# Patient Record
Sex: FEMALE | Race: BLACK OR AFRICAN AMERICAN | NOT HISPANIC OR LATINO | Employment: FULL TIME | URBAN - METROPOLITAN AREA
[De-identification: names, ages, dates, MRNs, and addresses within clinical notes are randomized per-mention and may not be internally consistent; named-entity substitution may affect disease eponyms.]

---

## 2024-05-13 ENCOUNTER — LAB VISIT (OUTPATIENT)
Dept: LAB | Facility: HOSPITAL | Age: 39
End: 2024-05-13
Attending: STUDENT IN AN ORGANIZED HEALTH CARE EDUCATION/TRAINING PROGRAM
Payer: MEDICAID

## 2024-05-13 ENCOUNTER — OFFICE VISIT (OUTPATIENT)
Facility: CLINIC | Age: 39
End: 2024-05-13
Payer: MEDICAID

## 2024-05-13 VITALS
RESPIRATION RATE: 18 BRPM | TEMPERATURE: 98 F | OXYGEN SATURATION: 98 % | WEIGHT: 252.19 LBS | BODY MASS INDEX: 39.58 KG/M2 | HEART RATE: 84 BPM | HEIGHT: 67 IN | SYSTOLIC BLOOD PRESSURE: 115 MMHG | DIASTOLIC BLOOD PRESSURE: 80 MMHG

## 2024-05-13 DIAGNOSIS — Z00.00 ANNUAL PHYSICAL EXAM: ICD-10-CM

## 2024-05-13 DIAGNOSIS — I10 BENIGN ESSENTIAL HTN: ICD-10-CM

## 2024-05-13 DIAGNOSIS — H90.3 SNHL (SENSORY-NEURAL HEARING LOSS), ASYMMETRICAL: ICD-10-CM

## 2024-05-13 DIAGNOSIS — R73.03 PREDIABETES: ICD-10-CM

## 2024-05-13 DIAGNOSIS — Z11.3 ROUTINE SCREENING FOR STI (SEXUALLY TRANSMITTED INFECTION): ICD-10-CM

## 2024-05-13 DIAGNOSIS — F33.1 MODERATE EPISODE OF RECURRENT MAJOR DEPRESSIVE DISORDER: ICD-10-CM

## 2024-05-13 DIAGNOSIS — Z00.00 ANNUAL PHYSICAL EXAM: Primary | ICD-10-CM

## 2024-05-13 DIAGNOSIS — N92.6 IRREGULAR MENSTRUATION: ICD-10-CM

## 2024-05-13 LAB
ALBUMIN SERPL BCP-MCNC: 3.9 G/DL (ref 3.5–5.2)
ALP SERPL-CCNC: 98 U/L (ref 55–135)
ALT SERPL W/O P-5'-P-CCNC: 21 U/L (ref 10–44)
ANION GAP SERPL CALC-SCNC: 9 MMOL/L (ref 8–16)
AST SERPL-CCNC: 17 U/L (ref 10–40)
BASOPHILS # BLD AUTO: 0.02 K/UL (ref 0–0.2)
BASOPHILS NFR BLD: 0.4 % (ref 0–1.9)
BILIRUB SERPL-MCNC: 0.3 MG/DL (ref 0.1–1)
BUN SERPL-MCNC: 9 MG/DL (ref 6–20)
CALCIUM SERPL-MCNC: 9.4 MG/DL (ref 8.7–10.5)
CHLORIDE SERPL-SCNC: 103 MMOL/L (ref 95–110)
CHOLEST SERPL-MCNC: 151 MG/DL (ref 120–199)
CHOLEST/HDLC SERPL: 3.7 {RATIO} (ref 2–5)
CO2 SERPL-SCNC: 28 MMOL/L (ref 23–29)
CREAT SERPL-MCNC: 0.7 MG/DL (ref 0.5–1.4)
DIFFERENTIAL METHOD BLD: ABNORMAL
EOSINOPHIL # BLD AUTO: 0 K/UL (ref 0–0.5)
EOSINOPHIL NFR BLD: 0.6 % (ref 0–8)
ERYTHROCYTE [DISTWIDTH] IN BLOOD BY AUTOMATED COUNT: 13.6 % (ref 11.5–14.5)
EST. GFR  (NO RACE VARIABLE): >60 ML/MIN/1.73 M^2
GLUCOSE SERPL-MCNC: 97 MG/DL (ref 70–110)
HCT VFR BLD AUTO: 41.8 % (ref 37–48.5)
HDLC SERPL-MCNC: 41 MG/DL (ref 40–75)
HDLC SERPL: 27.2 % (ref 20–50)
HGB BLD-MCNC: 12.6 G/DL (ref 12–16)
IMM GRANULOCYTES # BLD AUTO: 0.02 K/UL (ref 0–0.04)
IMM GRANULOCYTES NFR BLD AUTO: 0.4 % (ref 0–0.5)
LDLC SERPL CALC-MCNC: 81.6 MG/DL (ref 63–159)
LYMPHOCYTES # BLD AUTO: 1.3 K/UL (ref 1–4.8)
LYMPHOCYTES NFR BLD: 27.8 % (ref 18–48)
MCH RBC QN AUTO: 27.3 PG (ref 27–31)
MCHC RBC AUTO-ENTMCNC: 30.1 G/DL (ref 32–36)
MCV RBC AUTO: 91 FL (ref 82–98)
MONOCYTES # BLD AUTO: 0.4 K/UL (ref 0.3–1)
MONOCYTES NFR BLD: 9.2 % (ref 4–15)
NEUTROPHILS # BLD AUTO: 2.9 K/UL (ref 1.8–7.7)
NEUTROPHILS NFR BLD: 61.6 % (ref 38–73)
NONHDLC SERPL-MCNC: 110 MG/DL
NRBC BLD-RTO: 0 /100 WBC
PLATELET # BLD AUTO: 234 K/UL (ref 150–450)
PMV BLD AUTO: 11.1 FL (ref 9.2–12.9)
POTASSIUM SERPL-SCNC: 4.1 MMOL/L (ref 3.5–5.1)
PROT SERPL-MCNC: 7.4 G/DL (ref 6–8.4)
RBC # BLD AUTO: 4.61 M/UL (ref 4–5.4)
SODIUM SERPL-SCNC: 140 MMOL/L (ref 136–145)
TRIGL SERPL-MCNC: 142 MG/DL (ref 30–150)
TSH SERPL DL<=0.005 MIU/L-ACNC: 0.97 UIU/ML (ref 0.4–4)
WBC # BLD AUTO: 4.78 K/UL (ref 3.9–12.7)

## 2024-05-13 PROCEDURE — 36415 COLL VENOUS BLD VENIPUNCTURE: CPT | Performed by: STUDENT IN AN ORGANIZED HEALTH CARE EDUCATION/TRAINING PROGRAM

## 2024-05-13 PROCEDURE — 99204 OFFICE O/P NEW MOD 45 MIN: CPT | Mod: PBBFAC,PN | Performed by: STUDENT IN AN ORGANIZED HEALTH CARE EDUCATION/TRAINING PROGRAM

## 2024-05-13 PROCEDURE — 83001 ASSAY OF GONADOTROPIN (FSH): CPT | Performed by: STUDENT IN AN ORGANIZED HEALTH CARE EDUCATION/TRAINING PROGRAM

## 2024-05-13 PROCEDURE — 1159F MED LIST DOCD IN RCRD: CPT | Mod: CPTII,,, | Performed by: STUDENT IN AN ORGANIZED HEALTH CARE EDUCATION/TRAINING PROGRAM

## 2024-05-13 PROCEDURE — 80061 LIPID PANEL: CPT | Performed by: STUDENT IN AN ORGANIZED HEALTH CARE EDUCATION/TRAINING PROGRAM

## 2024-05-13 PROCEDURE — 86803 HEPATITIS C AB TEST: CPT | Performed by: STUDENT IN AN ORGANIZED HEALTH CARE EDUCATION/TRAINING PROGRAM

## 2024-05-13 PROCEDURE — 1160F RVW MEDS BY RX/DR IN RCRD: CPT | Mod: CPTII,,, | Performed by: STUDENT IN AN ORGANIZED HEALTH CARE EDUCATION/TRAINING PROGRAM

## 2024-05-13 PROCEDURE — 86593 SYPHILIS TEST NON-TREP QUANT: CPT | Performed by: STUDENT IN AN ORGANIZED HEALTH CARE EDUCATION/TRAINING PROGRAM

## 2024-05-13 PROCEDURE — 99386 PREV VISIT NEW AGE 40-64: CPT | Mod: S$PBB,,, | Performed by: STUDENT IN AN ORGANIZED HEALTH CARE EDUCATION/TRAINING PROGRAM

## 2024-05-13 PROCEDURE — 83036 HEMOGLOBIN GLYCOSYLATED A1C: CPT | Performed by: STUDENT IN AN ORGANIZED HEALTH CARE EDUCATION/TRAINING PROGRAM

## 2024-05-13 PROCEDURE — 84443 ASSAY THYROID STIM HORMONE: CPT | Performed by: STUDENT IN AN ORGANIZED HEALTH CARE EDUCATION/TRAINING PROGRAM

## 2024-05-13 PROCEDURE — 87340 HEPATITIS B SURFACE AG IA: CPT | Performed by: STUDENT IN AN ORGANIZED HEALTH CARE EDUCATION/TRAINING PROGRAM

## 2024-05-13 PROCEDURE — 4010F ACE/ARB THERAPY RXD/TAKEN: CPT | Mod: CPTII,,, | Performed by: STUDENT IN AN ORGANIZED HEALTH CARE EDUCATION/TRAINING PROGRAM

## 2024-05-13 PROCEDURE — 87389 HIV-1 AG W/HIV-1&-2 AB AG IA: CPT | Performed by: STUDENT IN AN ORGANIZED HEALTH CARE EDUCATION/TRAINING PROGRAM

## 2024-05-13 PROCEDURE — 3079F DIAST BP 80-89 MM HG: CPT | Mod: CPTII,,, | Performed by: STUDENT IN AN ORGANIZED HEALTH CARE EDUCATION/TRAINING PROGRAM

## 2024-05-13 PROCEDURE — 85025 COMPLETE CBC W/AUTO DIFF WBC: CPT | Performed by: STUDENT IN AN ORGANIZED HEALTH CARE EDUCATION/TRAINING PROGRAM

## 2024-05-13 PROCEDURE — 3074F SYST BP LT 130 MM HG: CPT | Mod: CPTII,,, | Performed by: STUDENT IN AN ORGANIZED HEALTH CARE EDUCATION/TRAINING PROGRAM

## 2024-05-13 PROCEDURE — 99999 PR PBB SHADOW E&M-NEW PATIENT-LVL IV: CPT | Mod: PBBFAC,,, | Performed by: STUDENT IN AN ORGANIZED HEALTH CARE EDUCATION/TRAINING PROGRAM

## 2024-05-13 PROCEDURE — 3008F BODY MASS INDEX DOCD: CPT | Mod: CPTII,,, | Performed by: STUDENT IN AN ORGANIZED HEALTH CARE EDUCATION/TRAINING PROGRAM

## 2024-05-13 PROCEDURE — 80053 COMPREHEN METABOLIC PANEL: CPT | Performed by: STUDENT IN AN ORGANIZED HEALTH CARE EDUCATION/TRAINING PROGRAM

## 2024-05-13 RX ORDER — ESCITALOPRAM OXALATE 20 MG/1
TABLET ORAL
Qty: 57 TABLET | Refills: 0 | Status: SHIPPED | OUTPATIENT
Start: 2024-05-13 | End: 2024-07-12

## 2024-05-13 RX ORDER — ALBUTEROL SULFATE 90 UG/1
2 AEROSOL, METERED RESPIRATORY (INHALATION)
COMMUNITY
Start: 2024-02-16

## 2024-05-13 RX ORDER — BENZONATATE 100 MG/1
100 CAPSULE ORAL EVERY 8 HOURS
COMMUNITY
Start: 2024-02-12

## 2024-05-13 RX ORDER — FLUCONAZOLE 150 MG/1
150 TABLET ORAL
COMMUNITY
Start: 2024-01-24 | End: 2024-05-13

## 2024-05-13 RX ORDER — LISINOPRIL AND HYDROCHLOROTHIAZIDE 10; 12.5 MG/1; MG/1
1 TABLET ORAL DAILY
Qty: 90 TABLET | Refills: 3 | Status: SHIPPED | OUTPATIENT
Start: 2024-05-13 | End: 2025-05-13

## 2024-05-13 RX ORDER — FLUTICASONE PROPIONATE 50 MCG
1 SPRAY, SUSPENSION (ML) NASAL 2 TIMES DAILY
COMMUNITY
Start: 2024-02-16

## 2024-05-13 NOTE — PROGRESS NOTES
SUBJECTIVE     CC: Establish care      HPI  Espinoza Alvarado is a 39 y.o. female with SNHL, HTN, prediabetes that presents for establishment of care and annual exam and c/o depression.    Pt reports that her partner passed in , caring for 5 kids (20, 22, 22, 14, 11) and sister is living with her. Nephew got admitted yesterday for mental health. Never been on medication before. Mom  when she was 14. PHQ9 score today 17.     Pt reports she has had HTN for 2 years, taking lopressor.   Pt reports irregular cycles for the past 3 months.       PAST MEDICAL HISTORY:  Past Medical History:   Diagnosis Date    Chlamydia     treated    Essential hypertension 2023    Normal vaginal delivery     x4  (set of twins)    Vaginal trichomoniasis 2012    treated       ALLERGIES AND MEDICATIONS: updated and reviewed.  Review of patient's allergies indicates:  No Known Allergies  Current Outpatient Medications   Medication Sig Dispense Refill    albuterol (PROVENTIL/VENTOLIN HFA) 90 mcg/actuation inhaler Inhale 2 puffs into the lungs.      benzonatate (TESSALON) 100 MG capsule Take 100 mg by mouth every 8 (eight) hours.      fluticasone propionate (FLONASE) 50 mcg/actuation nasal spray 1 spray by Each Nostril route 2 (two) times daily.      EScitalopram oxalate (LEXAPRO) 20 MG tablet Take 0.5 tablets (10 mg total) by mouth every evening for 7 days, THEN 1 tablet (20 mg total) every evening. 57 tablet 0    lisinopriL-hydrochlorothiazide (PRINZIDE,ZESTORETIC) 10-12.5 mg per tablet Take 1 tablet by mouth once daily. 90 tablet 3     No current facility-administered medications for this visit.       ROS  Review of Systems   Constitutional:  Negative for chills, fatigue and fever.   HENT:  Negative for rhinorrhea and sore throat.    Respiratory:  Negative for cough and shortness of breath.    Cardiovascular:  Negative for chest pain and palpitations.   Gastrointestinal:  Negative for constipation, diarrhea, nausea and  "vomiting.   Genitourinary:  Negative for dysuria.   Musculoskeletal:  Negative for joint swelling.   Skin:  Negative for rash and wound.   Neurological:  Negative for light-headedness and headaches.   Psychiatric/Behavioral:  Positive for decreased concentration, dysphoric mood and sleep disturbance. Negative for self-injury and suicidal ideas. The patient is nervous/anxious.          OBJECTIVE     Physical Exam  Vitals:    05/13/24 1034   BP: 115/80   Pulse: 84   Resp: 18   Temp: 98 °F (36.7 °C)    Body mass index is 39.5 kg/m².  Weight: 114.4 kg (252 lb 3.3 oz)   Height: 5' 7" (170.2 cm)     Physical Exam  Vitals reviewed.   Constitutional:       General: She is not in acute distress.  HENT:      Right Ear: External ear normal.      Left Ear: External ear normal.      Nose: Nose normal.      Mouth/Throat:      Mouth: Mucous membranes are moist.   Eyes:      Extraocular Movements: Extraocular movements intact.      Conjunctiva/sclera: Conjunctivae normal.      Pupils: Pupils are equal, round, and reactive to light.   Pulmonary:      Effort: Pulmonary effort is normal.   Abdominal:      General: There is no distension.      Palpations: Abdomen is soft.   Musculoskeletal:         General: No swelling. Normal range of motion.      Cervical back: Normal range of motion.   Skin:     General: Skin is warm and dry.      Findings: No rash.   Neurological:      General: No focal deficit present.      Mental Status: She is alert and oriented to person, place, and time.   Psychiatric:         Attention and Perception: Attention normal.         Mood and Affect: Mood is depressed. Affect is tearful.         Speech: Speech normal.         Behavior: Behavior is slowed and withdrawn. Behavior is not agitated, aggressive, hyperactive or combative. Behavior is cooperative.         Thought Content: Thought content is not paranoid. Thought content does not include homicidal or suicidal ideation.           Health Maintenance         " Date Due Completion Date    Cervical Cancer Screening Never done ---    Lipid Panel Never done ---    TETANUS VACCINE Never done ---    Hemoglobin A1c (Prediabetes) 05/31/2023 5/31/2022    COVID-19 Vaccine (3 - 2023-24 season) 09/01/2023 4/3/2021    Mammogram 08/28/2024 8/28/2023    Influenza Vaccine (Season Ended) 09/01/2024 11/11/2019              ASSESSMENT     39 y.o. female with     1. Annual physical exam    2. SNHL (sensory-neural hearing loss), asymmetrical    3. Prediabetes    4. Irregular menstruation    5. Moderate episode of recurrent major depressive disorder    6. Routine screening for STI (sexually transmitted infection)    7. Benign essential HTN        PLAN:     1. Annual physical exam  - Discussed age and gender appropriate screenings at this visit and encouraged a healthy diet low in simple carbohydrates, and increased physical activity.  Counseled on medically appropriate vaccines based on age and current health condition.  Screening test reviewed and discussed with patient.   - Hemoglobin A1C; Future  - Lipid Panel; Future  - TSH; Future  - Comprehensive Metabolic Panel; Future  - CBC Auto Differential; Future    2. SNHL (sensory-neural hearing loss), asymmetrical  - Stable, continue current regimen. Due for laboratory monitoring, ordered. Follow up 4 weeks.  - Hemoglobin A1C; Future  - Lipid Panel; Future  - TSH; Future  - Comprehensive Metabolic Panel; Future  - CBC Auto Differential; Future    3. Prediabetes  - Stable. Patient is encouraged to follow a diet low in carbohydrates and simple sugars. Advised to focus on good food choices and increased physical activity and encouraged to adhere to medication regimen and/or lifestyle adjustments, and to check glucose level as recommended.  Contact office if glucose levels are not improving over time.  Will monitor HbA1c appropriately.   - Hemoglobin A1C; Future  - Lipid Panel; Future  - TSH; Future  - Comprehensive Metabolic Panel; Future  - CBC  Auto Differential; Future    4. Irregular menstruation  - New. Check FSH, f/u 1month.  - FOLLICLE STIMULATING HORMONE; Future    5. Moderate episode of recurrent major depressive disorder  - New. Start lexapro and refer to behavioral health. F/u 1 month.  - Ambulatory referral/consult to Behavioral Health; Future  - EScitalopram oxalate (LEXAPRO) 20 MG tablet; Take 0.5 tablets (10 mg total) by mouth every evening for 7 days, THEN 1 tablet (20 mg total) every evening.  Dispense: 57 tablet; Refill: 0    6. Routine screening for STI (sexually transmitted infection)  - Due, ordered.  - HIV 1/2 Ag/Ab (4th Gen); Future  - Hepatitis C Antibody; Future  - Hepatitis B Surface Antigen; Future  - Treponema Pallidium Antibodies IgG, IgM; Future    7. Benign essential HTN  -\Stable. Patient was counseled and encouraged to maintain a low sodium diet, as well as increasing physical activity.  Recommend random BP checks at home on a regular basis. Will continue medication at this time, and follow up in 3-6 months, or sooner if blood pressure begins to increase.     - lisinopriL-hydrochlorothiazide (PRINZIDE,ZESTORETIC) 10-12.5 mg per tablet; Take 1 tablet by mouth once daily.  Dispense: 90 tablet; Refill: 3        Gillian Patterson MD  05/13/2024 10:51 AM        Follow up in about 4 weeks (around 6/10/2024).

## 2024-05-14 LAB
ESTIMATED AVG GLUCOSE: 128 MG/DL (ref 68–131)
FSH SERPL-ACNC: 1.43 MIU/ML
HBA1C MFR BLD: 6.1 % (ref 4–5.6)
HBV SURFACE AG SERPL QL IA: NORMAL
HCV AB SERPL QL IA: NORMAL
HIV 1+2 AB+HIV1 P24 AG SERPL QL IA: NORMAL
TREPONEMA PALLIDUM IGG+IGM AB [PRESENCE] IN SERUM OR PLASMA BY IMMUNOASSAY: NONREACTIVE

## 2024-05-21 NOTE — PROGRESS NOTES
Please let pt know her labs came back good except for prediabetes. We can discuss at her follow up visit next month. Thanks!

## 2024-07-09 DIAGNOSIS — F33.1 MODERATE EPISODE OF RECURRENT MAJOR DEPRESSIVE DISORDER: ICD-10-CM

## 2024-07-09 RX ORDER — ESCITALOPRAM OXALATE 20 MG/1
TABLET ORAL
Qty: 57 TABLET | Refills: 0 | Status: SHIPPED | OUTPATIENT
Start: 2024-07-09

## 2024-08-05 ENCOUNTER — OFFICE VISIT (OUTPATIENT)
Facility: CLINIC | Age: 39
End: 2024-08-05
Payer: COMMERCIAL

## 2024-08-05 VITALS
WEIGHT: 248 LBS | OXYGEN SATURATION: 98 % | SYSTOLIC BLOOD PRESSURE: 122 MMHG | BODY MASS INDEX: 38.85 KG/M2 | DIASTOLIC BLOOD PRESSURE: 84 MMHG | HEART RATE: 76 BPM | TEMPERATURE: 98 F

## 2024-08-05 DIAGNOSIS — E66.09 CLASS 2 OBESITY DUE TO EXCESS CALORIES WITH BODY MASS INDEX (BMI) OF 38.0 TO 38.9 IN ADULT, UNSPECIFIED WHETHER SERIOUS COMORBIDITY PRESENT: ICD-10-CM

## 2024-08-05 DIAGNOSIS — F33.1 MODERATE EPISODE OF RECURRENT MAJOR DEPRESSIVE DISORDER: Primary | ICD-10-CM

## 2024-08-05 DIAGNOSIS — R73.03 PREDIABETES: ICD-10-CM

## 2024-08-05 DIAGNOSIS — I10 BENIGN ESSENTIAL HTN: ICD-10-CM

## 2024-08-05 PROCEDURE — 99214 OFFICE O/P EST MOD 30 MIN: CPT | Mod: S$GLB,,, | Performed by: STUDENT IN AN ORGANIZED HEALTH CARE EDUCATION/TRAINING PROGRAM

## 2024-08-05 PROCEDURE — 1159F MED LIST DOCD IN RCRD: CPT | Mod: CPTII,S$GLB,, | Performed by: STUDENT IN AN ORGANIZED HEALTH CARE EDUCATION/TRAINING PROGRAM

## 2024-08-05 PROCEDURE — 99999 PR PBB SHADOW E&M-EST. PATIENT-LVL IV: CPT | Mod: PBBFAC,,, | Performed by: STUDENT IN AN ORGANIZED HEALTH CARE EDUCATION/TRAINING PROGRAM

## 2024-08-05 PROCEDURE — 4010F ACE/ARB THERAPY RXD/TAKEN: CPT | Mod: CPTII,S$GLB,, | Performed by: STUDENT IN AN ORGANIZED HEALTH CARE EDUCATION/TRAINING PROGRAM

## 2024-08-05 PROCEDURE — 3008F BODY MASS INDEX DOCD: CPT | Mod: CPTII,S$GLB,, | Performed by: STUDENT IN AN ORGANIZED HEALTH CARE EDUCATION/TRAINING PROGRAM

## 2024-08-05 PROCEDURE — 3079F DIAST BP 80-89 MM HG: CPT | Mod: CPTII,S$GLB,, | Performed by: STUDENT IN AN ORGANIZED HEALTH CARE EDUCATION/TRAINING PROGRAM

## 2024-08-05 PROCEDURE — 3044F HG A1C LEVEL LT 7.0%: CPT | Mod: CPTII,S$GLB,, | Performed by: STUDENT IN AN ORGANIZED HEALTH CARE EDUCATION/TRAINING PROGRAM

## 2024-08-05 PROCEDURE — 3074F SYST BP LT 130 MM HG: CPT | Mod: CPTII,S$GLB,, | Performed by: STUDENT IN AN ORGANIZED HEALTH CARE EDUCATION/TRAINING PROGRAM

## 2024-08-13 DIAGNOSIS — F33.1 MODERATE EPISODE OF RECURRENT MAJOR DEPRESSIVE DISORDER: ICD-10-CM

## 2024-08-13 RX ORDER — ESCITALOPRAM OXALATE 20 MG/1
20 TABLET ORAL DAILY
Qty: 90 TABLET | Refills: 3 | Status: SHIPPED | OUTPATIENT
Start: 2024-08-13 | End: 2025-08-13

## 2024-08-13 NOTE — TELEPHONE ENCOUNTER
Refill Routing Note   Medication(s) are not appropriate for processing by [facility] for the following reason(s):        Non-participating provider    [facility] action(s):  Route               Appointments  past 12m or future 3m with PCP    Date Provider   Last Visit   8/5/2024 Gillian Patterson MD   Next Visit   9/5/2024 Gillian Patterson MD   ED visits in past 90 days: 0        Note composed:7:01 AM 08/13/2024

## 2024-09-05 ENCOUNTER — LAB VISIT (OUTPATIENT)
Dept: LAB | Facility: HOSPITAL | Age: 39
End: 2024-09-05
Attending: STUDENT IN AN ORGANIZED HEALTH CARE EDUCATION/TRAINING PROGRAM
Payer: COMMERCIAL

## 2024-09-05 ENCOUNTER — OFFICE VISIT (OUTPATIENT)
Facility: CLINIC | Age: 39
End: 2024-09-05
Payer: COMMERCIAL

## 2024-09-05 VITALS
TEMPERATURE: 98 F | WEIGHT: 250.88 LBS | RESPIRATION RATE: 18 BRPM | HEART RATE: 83 BPM | OXYGEN SATURATION: 98 % | DIASTOLIC BLOOD PRESSURE: 101 MMHG | HEIGHT: 67 IN | SYSTOLIC BLOOD PRESSURE: 131 MMHG | BODY MASS INDEX: 39.38 KG/M2

## 2024-09-05 DIAGNOSIS — B35.1 ONYCHOMYCOSIS: ICD-10-CM

## 2024-09-05 DIAGNOSIS — E66.09 CLASS 2 OBESITY DUE TO EXCESS CALORIES WITH BODY MASS INDEX (BMI) OF 38.0 TO 38.9 IN ADULT, UNSPECIFIED WHETHER SERIOUS COMORBIDITY PRESENT: ICD-10-CM

## 2024-09-05 DIAGNOSIS — I10 BENIGN ESSENTIAL HTN: ICD-10-CM

## 2024-09-05 DIAGNOSIS — F33.1 MODERATE EPISODE OF RECURRENT MAJOR DEPRESSIVE DISORDER: ICD-10-CM

## 2024-09-05 DIAGNOSIS — L40.9 SCALP PSORIASIS: ICD-10-CM

## 2024-09-05 DIAGNOSIS — R21 RASH: ICD-10-CM

## 2024-09-05 DIAGNOSIS — N92.6 IRREGULAR MENSTRUATION: ICD-10-CM

## 2024-09-05 DIAGNOSIS — R73.03 PREDIABETES: Primary | ICD-10-CM

## 2024-09-05 DIAGNOSIS — R73.03 PREDIABETES: ICD-10-CM

## 2024-09-05 LAB
ESTIMATED AVG GLUCOSE: 126 MG/DL (ref 68–131)
HBA1C MFR BLD: 6 % (ref 4–5.6)

## 2024-09-05 PROCEDURE — 99999 PR PBB SHADOW E&M-EST. PATIENT-LVL IV: CPT | Mod: PBBFAC,,, | Performed by: STUDENT IN AN ORGANIZED HEALTH CARE EDUCATION/TRAINING PROGRAM

## 2024-09-05 PROCEDURE — 36415 COLL VENOUS BLD VENIPUNCTURE: CPT | Performed by: STUDENT IN AN ORGANIZED HEALTH CARE EDUCATION/TRAINING PROGRAM

## 2024-09-05 PROCEDURE — 83036 HEMOGLOBIN GLYCOSYLATED A1C: CPT | Performed by: STUDENT IN AN ORGANIZED HEALTH CARE EDUCATION/TRAINING PROGRAM

## 2024-09-05 RX ORDER — CICLOPIROX 80 MG/ML
SOLUTION TOPICAL NIGHTLY
Qty: 6.6 ML | Refills: 4 | Status: SHIPPED | OUTPATIENT
Start: 2024-09-05 | End: 2025-09-05

## 2024-09-05 RX ORDER — CLOTRIMAZOLE AND BETAMETHASONE DIPROPIONATE 10; .64 MG/G; MG/G
CREAM TOPICAL 2 TIMES DAILY
Qty: 45 G | Refills: 3 | Status: SHIPPED | OUTPATIENT
Start: 2024-09-05 | End: 2024-09-12

## 2024-09-05 RX ORDER — BUPROPION HYDROCHLORIDE 150 MG/1
150 TABLET ORAL DAILY
Qty: 90 TABLET | Refills: 3 | Status: SHIPPED | OUTPATIENT
Start: 2024-09-05 | End: 2025-09-05

## 2024-09-05 RX ORDER — KETOCONAZOLE 20 MG/ML
SHAMPOO, SUSPENSION TOPICAL
Qty: 120 ML | Refills: 5 | Status: SHIPPED | OUTPATIENT
Start: 2024-09-05

## 2024-09-05 RX ORDER — DROSPIRENONE 4 MG/1
TABLET, FILM COATED ORAL
Qty: 28 TABLET | Refills: 11 | Status: SHIPPED | OUTPATIENT
Start: 2024-09-05

## 2024-09-05 RX ORDER — FLUOCINOLONE ACETONIDE 0.11 MG/ML
OIL TOPICAL NIGHTLY PRN
Qty: 118.28 ML | Refills: 2 | Status: SHIPPED | OUTPATIENT
Start: 2024-09-05 | End: 2025-09-05

## 2024-09-05 NOTE — PROGRESS NOTES
SUBJECTIVE     Chief Complaint   Patient presents with    Hypertension     Follow up    Depression     Follow up       HPI  Espinoza Alvarado is a 39 y.o. female with HTN, depression, prediabetes,sensorineural hearing loss, and irregular menses that presents for follow-up of chronic medical problems.     Started on contrave at last visit for prediabetes and obesity. Reports was not able to fill due to cost.    Prediabetes: A1c on 5/13/24 was 6.1. She reports struggling with her weight and overeating frequently at night. She is walking several miles a day and is planning to start at home workouts as well.      Depression: Started Lexapro, stable but not improving completely. Reports therapy does not have any spots available with [facility]. PHQ9 today improved.     HTN: On Lopressor.      Irregular menses: FSH was WNL. Patient reports LMP was very heavy. Hx of migraine with aura.    Also c/o scalp flaking and itching.    PAST MEDICAL HISTORY:  Past Medical History:   Diagnosis Date    Chlamydia     treated    Essential hypertension 8/11/2023    Normal vaginal delivery     x4  (set of twins)    Vaginal trichomoniasis 06/05/2012    treated       ALLERGIES AND MEDICATIONS: updated and reviewed.  Review of patient's allergies indicates:  No Known Allergies  Current Outpatient Medications   Medication Sig Dispense Refill    EScitalopram oxalate (LEXAPRO) 20 MG tablet Take 1 tablet (20 mg total) by mouth once daily. 90 tablet 3    lisinopriL-hydrochlorothiazide (PRINZIDE,ZESTORETIC) 10-12.5 mg per tablet Take 1 tablet by mouth once daily. 90 tablet 3    buPROPion (WELLBUTRIN XL) 150 MG TB24 tablet Take 1 tablet (150 mg total) by mouth once daily. 90 tablet 3    ciclopirox (PENLAC) 8 % Soln Apply topically nightly. 6.6 mL 4    clotrimazole-betamethasone 1-0.05% (LOTRISONE) cream Apply topically 2 (two) times daily. for 7 days 45 g 3    drospirenone, contraceptive, (SLYND) 4 mg (28) Tab Take 1 tablet every day for 21 days,  "then do not take any medication for 7 days. You will get your cycle during this time. Restart after you complete the 7 days. 28 tablet 11    fluocinolone (DERMA-SMOOTHE) 0.01 % external oil Apply topically nightly as needed (scalp flaking). 118.28 mL 2    fluticasone propionate (FLONASE) 50 mcg/actuation nasal spray 1 spray by Each Nostril route 2 (two) times daily. (Patient not taking: Reported on 8/5/2024)      ketoconazole (NIZORAL) 2 % shampoo Apply topically twice a week. 120 mL 5     No current facility-administered medications for this visit.       ROS  Review of Systems   Constitutional:  Negative for chills, fatigue and fever.   HENT:  Negative for rhinorrhea and sore throat.    Respiratory:  Negative for cough and shortness of breath.    Cardiovascular:  Negative for chest pain and palpitations.   Gastrointestinal:  Negative for constipation, diarrhea, nausea and vomiting.   Genitourinary:  Negative for dysuria.   Musculoskeletal:  Negative for joint swelling.   Skin:  Negative for rash and wound.   Neurological:  Negative for light-headedness and headaches.   Psychiatric/Behavioral:  Negative for dysphoric mood and sleep disturbance. The patient is not nervous/anxious.          OBJECTIVE     Physical Exam  Vitals:    09/05/24 1033   BP: (!) 131/101   Pulse: 83   Resp: 18   Temp: 98.1 °F (36.7 °C)    Body mass index is 39.29 kg/m².  Weight: 113.8 kg (250 lb 14.1 oz)   Height: 5' 7" (170.2 cm)     Physical Exam  Vitals reviewed.   Constitutional:       General: She is not in acute distress.  HENT:      Right Ear: External ear normal.      Left Ear: External ear normal.      Nose: Nose normal.      Mouth/Throat:      Mouth: Mucous membranes are moist.   Eyes:      Extraocular Movements: Extraocular movements intact.      Conjunctiva/sclera: Conjunctivae normal.      Pupils: Pupils are equal, round, and reactive to light.   Pulmonary:      Effort: Pulmonary effort is normal.   Abdominal:      General: There " is no distension.      Palpations: Abdomen is soft.   Musculoskeletal:         General: No swelling. Normal range of motion.      Cervical back: Normal range of motion.   Skin:     General: Skin is warm and dry.      Findings: No rash.   Neurological:      General: No focal deficit present.      Mental Status: She is alert and oriented to person, place, and time.   Psychiatric:         Mood and Affect: Mood normal.         Behavior: Behavior normal.           Health Maintenance         Date Due Completion Date    Cervical Cancer Screening Never done ---    Pneumococcal Vaccines (Age 0-64) (1 of 2 - PCV) Never done ---    TETANUS VACCINE Never done ---    Mammogram 08/28/2024 8/28/2023    Influenza Vaccine (1) 09/01/2024 11/11/2019    COVID-19 Vaccine (3 - 2023-24 season) 09/01/2024 4/3/2021    Hemoglobin A1c (Prediabetes) 05/13/2025 5/13/2024              ASSESSMENT     39 y.o. female with     1. Prediabetes    2. Moderate episode of recurrent major depressive disorder    3. Benign essential HTN    4. Class 2 obesity due to excess calories with body mass index (BMI) of 38.0 to 38.9 in adult, unspecified whether serious comorbidity present    5. Irregular menstruation    6. Onychomycosis    7. Rash    8. Scalp psoriasis        PLAN:     1. Prediabetes  -New. Cont to monitor. Patient is encouraged to follow a diet low in carbohydrates and simple sugars. Advised to focus on good food choices and increased physical activity and encouraged to adhere to medication regimen and/or lifestyle adjustments, and to check glucose level as recommended.  Contact office if glucose levels are not improving over time.  Will monitor HbA1c appropriately.   - HEMOGLOBIN A1C; Future    2. Moderate episode of recurrent major depressive disorder  - Improved but not yet resolved. Add wellbutrin and refer to psych for counseling. F/u 6 weeks.  - buPROPion (WELLBUTRIN XL) 150 MG TB24 tablet; Take 1 tablet (150 mg total) by mouth once daily.   Dispense: 90 tablet; Refill: 3  - Ambulatory referral/consult to Psychology; Future    3. Benign essential HTN  -Elevated today. Patient was counseled and encouraged to maintain a low sodium diet, as well as increasing physical activity.  Recommend random BP checks at home on a regular basis. Will continue medication at this time, and follow up in 3-6 months, or sooner if blood pressure begins to increase.       4. Class 2 obesity due to excess calories with body mass index (BMI) of 38.0 to 38.9 in adult, unspecified whether serious comorbidity present  - Noted. Pt counseled on diet and exercise for healthy lifestyle.    5. Irregular menstruation  - Worsening. Start slynd, pt has hx of migraine with aura so is not candidate for estrogen containing contraceptives. F/u 6 weeks.  - drospirenone, contraceptive, (SLYND) 4 mg (28) Tab; Take 1 tablet every day for 21 days, then do not take any medication for 7 days. You will get your cycle during this time. Restart after you complete the 7 days.  Dispense: 28 tablet; Refill: 11    6. Onychomycosis  - New. Start penlac. F/u 6 weeks.  - ciclopirox (PENLAC) 8 % Soln; Apply topically nightly.  Dispense: 6.6 mL; Refill: 4    7. Rash  - New. Start lotrisone. F/u 6 weeks.  - clotrimazole-betamethasone 1-0.05% (LOTRISONE) cream; Apply topically 2 (two) times daily. for 7 days  Dispense: 45 g; Refill: 3    8. Scalp psoriasis  - New. Start lfluocinolone and nizoral. F/u 6 weeks.  - ketoconazole (NIZORAL) 2 % shampoo; Apply topically twice a week.  Dispense: 120 mL; Refill: 5  - fluocinolone (DERMA-SMOOTHE) 0.01 % external oil; Apply topically nightly as needed (scalp flaking).  Dispense: 118.28 mL; Refill: 2        Gillian Patterson MD  09/05/2024 10:37 AM        Follow up in about 6 weeks (around 10/17/2024).

## 2024-09-11 DIAGNOSIS — Z12.31 OTHER SCREENING MAMMOGRAM: ICD-10-CM

## 2024-10-02 ENCOUNTER — HOSPITAL ENCOUNTER (OUTPATIENT)
Dept: RADIOLOGY | Facility: HOSPITAL | Age: 39
Discharge: HOME | End: 2024-10-02
Attending: STUDENT IN AN ORGANIZED HEALTH CARE EDUCATION/TRAINING PROGRAM
Payer: COMMERCIAL

## 2024-10-02 DIAGNOSIS — Z12.31 OTHER SCREENING MAMMOGRAM: ICD-10-CM

## 2024-10-02 PROCEDURE — 77067 SCR MAMMO BI INCL CAD: CPT | Mod: TC

## 2025-01-17 ENCOUNTER — OFFICE VISIT (OUTPATIENT)
Facility: CLINIC | Age: 40
End: 2025-01-17
Payer: COMMERCIAL

## 2025-01-17 VITALS
OXYGEN SATURATION: 98 % | TEMPERATURE: 98 F | WEIGHT: 253.06 LBS | HEIGHT: 67 IN | SYSTOLIC BLOOD PRESSURE: 132 MMHG | BODY MASS INDEX: 39.72 KG/M2 | HEART RATE: 86 BPM | RESPIRATION RATE: 18 BRPM | DIASTOLIC BLOOD PRESSURE: 88 MMHG

## 2025-01-17 DIAGNOSIS — N92.6 IRREGULAR MENSTRUATION: ICD-10-CM

## 2025-01-17 DIAGNOSIS — L40.9 SCALP PSORIASIS: ICD-10-CM

## 2025-01-17 DIAGNOSIS — Z63.79 STRESS DUE TO ILLNESS OF FAMILY MEMBER: ICD-10-CM

## 2025-01-17 DIAGNOSIS — E66.01 SEVERE OBESITY (BMI 35.0-39.9) WITH COMORBIDITY: Primary | ICD-10-CM

## 2025-01-17 DIAGNOSIS — I10 BENIGN ESSENTIAL HTN: ICD-10-CM

## 2025-01-17 DIAGNOSIS — F33.1 MODERATE EPISODE OF RECURRENT MAJOR DEPRESSIVE DISORDER: ICD-10-CM

## 2025-01-17 DIAGNOSIS — M21.619 BUNION: ICD-10-CM

## 2025-01-17 DIAGNOSIS — R73.03 PREDIABETES: ICD-10-CM

## 2025-01-17 PROCEDURE — 1160F RVW MEDS BY RX/DR IN RCRD: CPT | Mod: CPTII,S$GLB,, | Performed by: STUDENT IN AN ORGANIZED HEALTH CARE EDUCATION/TRAINING PROGRAM

## 2025-01-17 PROCEDURE — 99999 PR PBB SHADOW E&M-EST. PATIENT-LVL V: CPT | Mod: PBBFAC,,, | Performed by: STUDENT IN AN ORGANIZED HEALTH CARE EDUCATION/TRAINING PROGRAM

## 2025-01-17 PROCEDURE — 3008F BODY MASS INDEX DOCD: CPT | Mod: CPTII,S$GLB,, | Performed by: STUDENT IN AN ORGANIZED HEALTH CARE EDUCATION/TRAINING PROGRAM

## 2025-01-17 PROCEDURE — 99214 OFFICE O/P EST MOD 30 MIN: CPT | Mod: S$GLB,,, | Performed by: STUDENT IN AN ORGANIZED HEALTH CARE EDUCATION/TRAINING PROGRAM

## 2025-01-17 PROCEDURE — 3075F SYST BP GE 130 - 139MM HG: CPT | Mod: CPTII,S$GLB,, | Performed by: STUDENT IN AN ORGANIZED HEALTH CARE EDUCATION/TRAINING PROGRAM

## 2025-01-17 PROCEDURE — 1159F MED LIST DOCD IN RCRD: CPT | Mod: CPTII,S$GLB,, | Performed by: STUDENT IN AN ORGANIZED HEALTH CARE EDUCATION/TRAINING PROGRAM

## 2025-01-17 PROCEDURE — 3079F DIAST BP 80-89 MM HG: CPT | Mod: CPTII,S$GLB,, | Performed by: STUDENT IN AN ORGANIZED HEALTH CARE EDUCATION/TRAINING PROGRAM

## 2025-01-17 RX ORDER — FLUOCINOLONE ACETONIDE 0.11 MG/ML
OIL TOPICAL NIGHTLY PRN
Qty: 118.28 ML | Refills: 2 | Status: SHIPPED | OUTPATIENT
Start: 2025-01-17 | End: 2026-01-17

## 2025-01-17 RX ORDER — BUPROPION HYDROCHLORIDE 150 MG/1
150 TABLET ORAL DAILY
Qty: 90 TABLET | Refills: 3 | Status: SHIPPED | OUTPATIENT
Start: 2025-01-17 | End: 2026-01-17

## 2025-01-17 RX ORDER — ESCITALOPRAM OXALATE 20 MG/1
20 TABLET ORAL DAILY
Qty: 90 TABLET | Refills: 3 | Status: SHIPPED | OUTPATIENT
Start: 2025-01-17 | End: 2026-01-17

## 2025-01-17 RX ORDER — DROSPIRENONE 4 MG/1
TABLET, FILM COATED ORAL
Qty: 28 TABLET | Refills: 11 | Status: SHIPPED | OUTPATIENT
Start: 2025-01-17

## 2025-01-17 NOTE — PROGRESS NOTES
"SUBJECTIVE     Chief Complaint   Patient presents with    Diabetes     Follow up       History of Present Illness    CHIEF COMPLAINT:  Patient presents today for follow-up on medications and various health concerns.    MENTAL HEALTH:  She reports mood fluctuations, describing her mood as "up and down" due to ongoing family situations. While experiencing periods of depression, she emphasizes overall resilience. Her current medications are helping with mood management. She expresses interest in counseling, specifically to speak with an unbiased professional. A previous counseling referral was unsuccessful due to lack of provider availability.    MEDICATIONS:  She has been unable to obtain some previously prescribed medications. The medications she has been able to take are reported as effective.    DERMATOLOGY:  She continues to experience scalp problems. She is managing symptoms with home remedies and washing her hair every two weeks instead of the recommended twice weekly frequency. She has been unable to obtain prescribed oil treatment.    MUSCULOSKELETAL:  She expresses concern about potential foot deformity, specifically worried about foot misalignment. Family history of foot conditions contributes to her concerns.      ROS:  General: -fever, -chills, -fatigue, -weight gain, -weight loss  Eyes: -vision changes, -redness, -discharge  ENT: -ear pain, -nasal congestion, -sore throat  Cardiovascular: -chest pain, -palpitations, -lower extremity edema  Respiratory: -cough, -shortness of breath  Gastrointestinal: -abdominal pain, -nausea, -vomiting, -diarrhea, -constipation, -blood in stool  Genitourinary: -dysuria, -hematuria, -frequency  Musculoskeletal: -joint pain, -muscle pain  Skin: +rash, -lesion  Neurological: -headache, -dizziness, -numbness, -tingling  Psychiatric: -anxiety, +depression, -sleep difficulty           PAST MEDICAL HISTORY:  Past Medical History:   Diagnosis Date    Chlamydia     treated    " "Essential hypertension 8/11/2023    Normal vaginal delivery     x4  (set of twins)    Vaginal trichomoniasis 06/05/2012    treated       ALLERGIES AND MEDICATIONS: updated and reviewed.  Review of patient's allergies indicates:  No Known Allergies  Current Outpatient Medications   Medication Sig Dispense Refill    ciclopirox (PENLAC) 8 % Soln Apply topically nightly. 6.6 mL 4    ketoconazole (NIZORAL) 2 % shampoo Apply topically twice a week. 120 mL 5    buPROPion (WELLBUTRIN XL) 150 MG TB24 tablet Take 1 tablet (150 mg total) by mouth once daily. 90 tablet 3    drospirenone, contraceptive, (SLYND) 4 mg (28) Tab Take 1 tablet every day for 21 days, then do not take any medication for 7 days. You will get your cycle during this time. Restart after you complete the 7 days. 28 tablet 11    EScitalopram oxalate (LEXAPRO) 20 MG tablet Take 1 tablet (20 mg total) by mouth once daily. 90 tablet 3    fluocinolone (DERMA-SMOOTHE) 0.01 % external oil Apply topically nightly as needed (scalp flaking). 118.28 mL 2     No current facility-administered medications for this visit.         OBJECTIVE     Physical Exam  Vitals:    01/17/25 0901   BP: 132/88   Pulse: 86   Resp: 18   Temp: 97.6 °F (36.4 °C)    Body mass index is 39.64 kg/m².  Weight: 114.8 kg (253 lb 1.4 oz)   Height: 5' 7" (170.2 cm)     Physical Exam  Vitals reviewed.   Constitutional:       General: She is not in acute distress.  HENT:      Right Ear: External ear normal.      Left Ear: External ear normal.      Nose: Nose normal.      Mouth/Throat:      Mouth: Mucous membranes are moist.   Eyes:      Extraocular Movements: Extraocular movements intact.      Conjunctiva/sclera: Conjunctivae normal.      Pupils: Pupils are equal, round, and reactive to light.   Pulmonary:      Effort: Pulmonary effort is normal.   Abdominal:      General: There is no distension.      Palpations: Abdomen is soft.   Musculoskeletal:         General: No swelling. Normal range of motion. "      Cervical back: Normal range of motion.   Skin:     General: Skin is warm and dry.      Findings: No rash.   Neurological:      General: No focal deficit present.      Mental Status: She is alert and oriented to person, place, and time.   Psychiatric:         Mood and Affect: Mood normal.         Behavior: Behavior normal.           Health Maintenance         Date Due Completion Date    Cervical Cancer Screening Never done ---    TETANUS VACCINE Never done ---    Pneumococcal Vaccines (Age 0-49) (1 of 2 - PCV) Never done ---    Influenza Vaccine (1) 09/01/2024 11/11/2019    COVID-19 Vaccine (3 - 2024-25 season) 09/01/2024 4/3/2021    Hemoglobin A1c (Prediabetes) 09/05/2025 9/5/2024    Mammogram 10/02/2025 10/2/2024    RSV Vaccine (Age 60+ and Pregnant patients) (1 - 1-dose 75+ series) 01/30/2058 ---              ASSESSMENT     39 y.o. female with     1. Severe obesity (BMI 35.0-39.9) with comorbidity    2. Moderate episode of recurrent major depressive disorder    3. Prediabetes    4. Benign essential HTN    5. Stress due to illness of family member    6. Scalp psoriasis    7. Irregular menstruation    8. Bunion      - Addressed patient's difficulty obtaining previously prescribed medications by sending prescriptions to [facility] for delivery and insurance coordination  - Considered cost-effective alternatives for scalp treatment, including OTC options and coupon for prescription medication  - Recommend alternating use of T-sal and ketoconazole shampoos for scalp condition management  - Assessed patient's mood and determined need for counseling referral  - Evaluated foot condition and determined podiatry referral necessary  PLAN:     1. Severe obesity (BMI 35.0-39.9) with comorbidity  - Noted. Pt counseled on diet and exercise for healthy lifestyle.    2. Moderate episode of recurrent major depressive disorder  - Patient reports mood fluctuations due to family situations but overall feels strong.  -  Inquired about medication effectiveness for mood management.  - Acknowledged patient's desire for counseling and discussed previous referral issues.  - Planned to send a new referral to [facility] for counseling.  - Scheduled a follow-up visit in 3 months.  - EScitalopram oxalate (LEXAPRO) 20 MG tablet; Take 1 tablet (20 mg total) by mouth once daily.  Dispense: 90 tablet; Refill: 3  - buPROPion (WELLBUTRIN XL) 150 MG TB24 tablet; Take 1 tablet (150 mg total) by mouth once daily.  Dispense: 90 tablet; Refill: 3  - Ambulatory referral/consult to Psychology; Future    3. Prediabetes  - Stable, continue current regimen.     4. Benign essential HTN  - Patient was counseled and encouraged to maintain a low sodium diet, as well as increasing physical activity.  Recommend random BP checks at home on a regular basis. Will continue medication at this time, and follow up in 3-6 months, or sooner if blood pressure begins to increase.       5. Stress due to illness of family member  - Acknowledged patient's strength despite difficult circumstances.    6. Scalp psoriasis  - Patient reports ongoing scalp itching.  - Prescribed T-sal shampoo with salicylic acid to be alternated weekly with ketoconazole shampoo.  - Sent prescription for scalp oil to hospital pharmacy to assess cost, with plan to provide coupon if still expensive.  - fluocinolone (DERMA-SMOOTHE) 0.01 % external oil; Apply topically nightly as needed (scalp flaking).  Dispense: 118.28 mL; Refill: 2    7. Irregular menstruation  - Started birth control pill.  - Provided coupon card for cost reduction due to higher cost.  - drospirenone, contraceptive, (SLYND) 4 mg (28) Tab; Take 1 tablet every day for 21 days, then do not take any medication for 7 days. You will get your cycle during this time. Restart after you complete the 7 days.  Dispense: 28 tablet; Refill: 11    8. Bunion  - Explained that the foot condition is likely a mild bunion, often caused by shoe  friction.  - Discussed and instructed the use of moleskin to cover the bunion, prevent friction, and potentially slow its growth.  - Referred the patient to a podiatrist for evaluation.  - Addressed patient's concern about worsening, reassuring that the condition is currently mild.  - Ambulatory referral/consult to Podiatry; Future    FOLLOW UP:  - Patient to contact the office to follow up on referrals.         Gillian Patterson MD  01/17/2025 9:42 AM        Follow up in about 3 months (around 4/17/2025).    This note was generated with the assistance of ambient listening technology. Verbal consent was obtained by the patient and accompanying visitor(s) for the recording of patient appointment to facilitate this note. I attest to having reviewed and edited the generated note for accuracy, though some syntax or spelling errors may persist. Please contact the author of this note for any clarification.

## 2025-01-17 NOTE — PATIENT INSTRUCTIONS
T- SAL Shampoo every other wash  Ketoconazole shampoo every other wash  Moleskin on bunion      [phone] to schedule with the foot doctor and the psychologist.

## 2025-01-21 ENCOUNTER — TELEPHONE (OUTPATIENT)
Dept: PODIATRY | Facility: CLINIC | Age: 40
End: 2025-01-21
Payer: COMMERCIAL
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
## 2025-01-31 ENCOUNTER — TELEPHONE (OUTPATIENT)
Dept: PODIATRY | Facility: CLINIC | Age: 40
End: 2025-01-31
Payer: COMMERCIAL
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
## 2025-02-13 ENCOUNTER — NURSE TRIAGE (OUTPATIENT)
Dept: ADMINISTRATIVE | Facility: CLINIC | Age: 40
End: 2025-02-13
Payer: COMMERCIAL

## 2025-02-13 ENCOUNTER — APPOINTMENT (OUTPATIENT)
Dept: LAB | Facility: HOSPITAL | Age: 40
End: 2025-02-13
Payer: COMMERCIAL

## 2025-02-13 ENCOUNTER — OFFICE VISIT (OUTPATIENT)
Facility: CLINIC | Age: 40
End: 2025-02-13
Payer: COMMERCIAL

## 2025-02-13 VITALS
DIASTOLIC BLOOD PRESSURE: 92 MMHG | HEART RATE: 64 BPM | OXYGEN SATURATION: 98 % | BODY MASS INDEX: 39.71 KG/M2 | TEMPERATURE: 97 F | SYSTOLIC BLOOD PRESSURE: 130 MMHG | WEIGHT: 253 LBS | HEIGHT: 67 IN | RESPIRATION RATE: 18 BRPM

## 2025-02-13 DIAGNOSIS — Z20.2 EXPOSURE TO SEXUALLY TRANSMITTED DISEASE (STD): Primary | ICD-10-CM

## 2025-02-13 PROCEDURE — 99999 PR PBB SHADOW E&M-EST. PATIENT-LVL IV: CPT | Mod: PBBFAC,,,

## 2025-02-13 PROCEDURE — 3075F SYST BP GE 130 - 139MM HG: CPT | Mod: CPTII,S$GLB,,

## 2025-02-13 PROCEDURE — 1159F MED LIST DOCD IN RCRD: CPT | Mod: CPTII,S$GLB,,

## 2025-02-13 PROCEDURE — 1160F RVW MEDS BY RX/DR IN RCRD: CPT | Mod: CPTII,S$GLB,,

## 2025-02-13 PROCEDURE — 87591 N.GONORRHOEAE DNA AMP PROB: CPT

## 2025-02-13 PROCEDURE — 4010F ACE/ARB THERAPY RXD/TAKEN: CPT | Mod: CPTII,S$GLB,,

## 2025-02-13 PROCEDURE — 81515 NFCT DS BV&VAGINITIS DNA ALG: CPT

## 2025-02-13 PROCEDURE — 99214 OFFICE O/P EST MOD 30 MIN: CPT | Mod: S$GLB,,,

## 2025-02-13 PROCEDURE — 3008F BODY MASS INDEX DOCD: CPT | Mod: CPTII,S$GLB,,

## 2025-02-13 PROCEDURE — 3080F DIAST BP >= 90 MM HG: CPT | Mod: CPTII,S$GLB,,

## 2025-02-13 RX ORDER — METRONIDAZOLE 500 MG/1
500 TABLET ORAL EVERY 12 HOURS
Qty: 14 TABLET | Refills: 0 | Status: SHIPPED | OUTPATIENT
Start: 2025-02-13 | End: 2025-02-20
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
## 2025-02-17 LAB
BACTERIAL VAGINOSIS DNA: DETECTED
C TRACH DNA SPEC QL NAA+PROBE: NOT DETECTED
CANDIDA GLABRATA/KRUSEI: NOT DETECTED
CANDIDA RRNA VAG QL PROBE: NOT DETECTED
N GONORRHOEA DNA SPEC QL NAA+PROBE: NOT DETECTED
TRICHOMONAS VAGINALIS: DETECTED

## 2025-02-27 ENCOUNTER — TELEPHONE (OUTPATIENT)
Dept: PODIATRY | Facility: CLINIC | Age: 40
End: 2025-02-27
Payer: COMMERCIAL

## 2025-02-27 NOTE — TELEPHONE ENCOUNTER
Called pt and was speaking with pt but phone disconnected. Scheduled an appt and messaged in portal.

## 2025-02-27 NOTE — PROGRESS NOTES
"SUBJECTIVE     History of Present Illness    CHIEF COMPLAINT:  Ms. Alvarado presents today for STD screening after sexual partner reported exposure to trichomonas    HISTORY OF PRESENT ILLNESS:  She reports being notified by her sexual partner of trichomonas exposure a few days ago. Her partner has already obtained treatment. She notes slight vaginal discharge and odor.    REVIEW OF SYMPTOMS:  She denies fever, chills, fatigue, dysuria, and genital rashes. She denies respiratory symptoms including runny nose, sore throat, cough, and shortness of breath. She denies GI symptoms including constipation, diarrhea, nausea, and vomiting.      ROS:  General: -fever, -chills, -fatigue, -weight gain, -weight loss  Eyes: -vision changes, -redness, -discharge  ENT: -ear pain, -nasal congestion, -sore throat  Cardiovascular: -chest pain, -palpitations, -lower extremity edema  Respiratory: -cough, -shortness of breath  Gastrointestinal: -abdominal pain, -nausea, -vomiting, -diarrhea, -constipation, -blood in stool  Genitourinary: -dysuria, -hematuria, -frequency  Musculoskeletal: -joint pain, -muscle pain  Skin: -rash, -lesion  Neurological: -headache, -dizziness, -numbness, -tingling  Psychiatric: -anxiety, -depression, -sleep difficulty  Female Genitourinary: +vaginal discharge           PAST MEDICAL HISTORY:  Past Medical History:   Diagnosis Date    Chlamydia     treated    Essential hypertension 8/11/2023    Normal vaginal delivery     x4  (set of twins)    Vaginal trichomoniasis 06/05/2012    treated       ALLERGIES AND MEDICATIONS: updated and reviewed.  Review of patient's allergies indicates:  No Known Allergies  Current Medications[1]        OBJECTIVE     Physical Exam  Vitals:    02/13/25 1400   BP: (!) 130/92   Pulse: 64   Resp: 18   Temp: 96.9 °F (36.1 °C)    Body mass index is 39.62 kg/m².  Weight: 114.8 kg (252 lb 15.7 oz)   Height: 5' 7" (170.2 cm)     Physical Exam  Vitals reviewed.   Constitutional:       " General: She is not in acute distress.  HENT:      Right Ear: External ear normal.      Left Ear: External ear normal.      Nose: Nose normal.      Mouth/Throat:      Mouth: Mucous membranes are moist.   Eyes:      Extraocular Movements: Extraocular movements intact.      Conjunctiva/sclera: Conjunctivae normal.      Pupils: Pupils are equal, round, and reactive to light.   Pulmonary:      Effort: Pulmonary effort is normal.   Abdominal:      General: There is no distension.      Palpations: Abdomen is soft.   Musculoskeletal:         General: No swelling. Normal range of motion.      Cervical back: Normal range of motion.   Skin:     General: Skin is warm and dry.      Findings: No rash.   Neurological:      General: No focal deficit present.      Mental Status: She is alert and oriented to person, place, and time.   Psychiatric:         Mood and Affect: Mood normal.         Behavior: Behavior normal.            Health Maintenance         Date Due Completion Date    Cervical Cancer Screening Never done ---    TETANUS VACCINE Never done ---    Pneumococcal Vaccines (Age 0-49) (1 of 2 - PCV) Never done ---    Influenza Vaccine (1) 09/01/2024 11/11/2019    COVID-19 Vaccine (3 - 2024-25 season) 09/01/2024 4/3/2021    Hemoglobin A1c (Prediabetes) 09/05/2025 9/5/2024    Mammogram 10/02/2025 10/2/2024    RSV Vaccine (Age 60+ and Pregnant patients) (1 - 1-dose 75+ series) 01/30/2058 ---              ASSESSMENT     40 y.o. female with     1. Exposure to sexually transmitted disease (STD)        PLAN:     1. Exposure to sexually transmitted disease (STD)  Pt is seeking to be screened for STD's today. Counseled patient on the importance of practicing safe sexual practices at all times using barrier protection (condoms) to prevent against transmission of these diseases. Will draw labs today and develop treatment plan if necessary based on findings-    - metroNIDAZOLE (FLAGYL) 500 MG tablet; Take 1 tablet (500 mg total) by  mouth every 12 (twelve) hours. for 7 days  Dispense: 14 tablet; Refill: 0  - Vaginosis Screen by DNA Probe  - C. trachomatis/N. gonorrhoeae by AMP DNA  - HIV 1/2 Ag/Ab (4th Gen); Future  - Hepatitis C Antibody; Future  - Hepatitis B Surface Antigen; Future  - Treponema Pallidium Antibodies IgG, IgM; Future      Assessment & Plan    Evaluated patient for STD exposure, specifically trichomonas  Ordered comprehensive STD panel  Initiated empiric treatment for trichomonas and potential bacterial vaginosis with Flagyl  Deferred herpes testing due to absence of rashes  Assessed for systemic symptoms, finding none present    TRICHOMONAS INFECTION:  - Ordered urine sample for trichomonas testing.  - Prescribed Flagyl (metronidazole) 500mg orally twice daily for 7 days to treat potential trichomonas infection.  - Advised the patient to take medication with food to minimize GI side effects.  - Emphasized the importance of completing the full course of antibiotics.  - Discussed potential side effect of metallic taste from Flagyl.  - Cautioned against alcohol consumption while taking Flagyl and for 3 days after completion.  - Instructed the patient to abstain from sexual activity for 1 week after completing the antibiotic course.  - Advised the patient to wash all underclothes in hot water.    BACTERIAL VAGINOSIS:  - Ordered vaginal swab for bacterial vaginosis.  - Prescribed Flagyl (metronidazole) 500mg orally twice daily for 7 days to treat potential bacterial vaginosis.  - Noted patient's report of slight vaginal discharge.    STD SCREENING:  - Ordered comprehensive STD screening including urine sample for gonorrhea and chlamydia testing.  - Ordered labs for HIV, hepatitis B, hepatitis C, and syphilis.  - Emphasized the importance of safe sex practices, including consistent condom use.    FOLLOW UP:  - Scheduled follow-up visit in 4 weeks.  - Follow up in 4 weeks.    - Contact office if any questions or concerns arise before  follow-up visit.         AMERICO Bertrand  02/26/2025 7:20 PM      Follow up in about 4 weeks (around 3/13/2025).      This note was generated with the assistance of ambient listening technology. Verbal consent was obtained by the patient and accompanying visitor(s) for the recording of patient appointment to facilitate this note. I attest to having reviewed and edited the generated note for accuracy, though some syntax or spelling errors may persist. Please contact the author of this note for any clarification.                 [1]   Current Outpatient Medications   Medication Sig Dispense Refill    EScitalopram oxalate (LEXAPRO) 20 MG tablet Take 1 tablet (20 mg total) by mouth once daily. 90 tablet 3    fluocinolone (DERMA-SMOOTHE) 0.01 % external oil Apply topically nightly as needed (scalp flaking). 118.28 mL 2    ketoconazole (NIZORAL) 2 % shampoo Apply topically twice a week. 120 mL 5    buPROPion (WELLBUTRIN XL) 150 MG TB24 tablet Take 1 tablet (150 mg total) by mouth once daily. (Patient not taking: Reported on 2/13/2025) 90 tablet 3    ciclopirox (PENLAC) 8 % Soln Apply topically nightly. (Patient not taking: Reported on 2/13/2025) 6.6 mL 4    drospirenone, contraceptive, (SLYND) 4 mg (28) Tab Take 1 tablet every day for 21 days, then do not take any medication for 7 days. You will get your cycle during this time. Restart after you complete the 7 days. (Patient not taking: Reported on 2/13/2025) 28 tablet 11     No current facility-administered medications for this visit.

## 2025-03-06 ENCOUNTER — RESULTS FOLLOW-UP (OUTPATIENT)
Facility: CLINIC | Age: 40
End: 2025-03-06

## 2025-03-17 ENCOUNTER — PATIENT MESSAGE (OUTPATIENT)
Dept: ADMINISTRATIVE | Facility: HOSPITAL | Age: 40
End: 2025-03-17
Payer: COMMERCIAL

## 2025-03-21 ENCOUNTER — TELEPHONE (OUTPATIENT)
Dept: PODIATRY | Facility: CLINIC | Age: 40
End: 2025-03-21
Payer: COMMERCIAL

## 2025-03-21 NOTE — TELEPHONE ENCOUNTER
Left voice message for patient appointment has been canceled due to insurance is out of network. Financial phone number was provided for further assistance.

## 2025-04-02 DIAGNOSIS — I10 BENIGN ESSENTIAL HTN: Primary | ICD-10-CM

## 2025-04-02 RX ORDER — LOSARTAN POTASSIUM 50 MG/1
50 TABLET ORAL DAILY
Qty: 90 TABLET | Refills: 3 | Status: SHIPPED | OUTPATIENT
Start: 2025-04-02 | End: 2026-04-02

## 2025-04-07 ENCOUNTER — PATIENT MESSAGE (OUTPATIENT)
Dept: ADMINISTRATIVE | Facility: HOSPITAL | Age: 40
End: 2025-04-07
Payer: COMMERCIAL

## 2025-04-22 ENCOUNTER — PATIENT OUTREACH (OUTPATIENT)
Dept: ADMINISTRATIVE | Facility: OTHER | Age: 40
End: 2025-04-22
Payer: COMMERCIAL

## 2025-04-22 ENCOUNTER — OFFICE VISIT (OUTPATIENT)
Facility: CLINIC | Age: 40
End: 2025-04-22
Payer: COMMERCIAL

## 2025-04-22 ENCOUNTER — LAB VISIT (OUTPATIENT)
Dept: LAB | Facility: HOSPITAL | Age: 40
End: 2025-04-22
Attending: STUDENT IN AN ORGANIZED HEALTH CARE EDUCATION/TRAINING PROGRAM
Payer: COMMERCIAL

## 2025-04-22 VITALS
BODY MASS INDEX: 38.42 KG/M2 | OXYGEN SATURATION: 98 % | HEART RATE: 86 BPM | WEIGHT: 244.81 LBS | DIASTOLIC BLOOD PRESSURE: 84 MMHG | HEIGHT: 67 IN | SYSTOLIC BLOOD PRESSURE: 110 MMHG | RESPIRATION RATE: 18 BRPM | TEMPERATURE: 98 F

## 2025-04-22 DIAGNOSIS — H90.A11 CONDUCTIVE HEARING LOSS OF RIGHT EAR WITH RESTRICTED HEARING OF LEFT EAR: ICD-10-CM

## 2025-04-22 DIAGNOSIS — R73.03 PREDIABETES: ICD-10-CM

## 2025-04-22 DIAGNOSIS — E66.812 CLASS 2 OBESITY DUE TO EXCESS CALORIES WITH BODY MASS INDEX (BMI) OF 38.0 TO 38.9 IN ADULT, UNSPECIFIED WHETHER SERIOUS COMORBIDITY PRESENT: ICD-10-CM

## 2025-04-22 DIAGNOSIS — Z11.3 ROUTINE SCREENING FOR STI (SEXUALLY TRANSMITTED INFECTION): ICD-10-CM

## 2025-04-22 DIAGNOSIS — E66.09 CLASS 2 OBESITY DUE TO EXCESS CALORIES WITH BODY MASS INDEX (BMI) OF 38.0 TO 38.9 IN ADULT, UNSPECIFIED WHETHER SERIOUS COMORBIDITY PRESENT: ICD-10-CM

## 2025-04-22 DIAGNOSIS — I10 BENIGN ESSENTIAL HTN: ICD-10-CM

## 2025-04-22 DIAGNOSIS — F33.1 MODERATE EPISODE OF RECURRENT MAJOR DEPRESSIVE DISORDER: ICD-10-CM

## 2025-04-22 DIAGNOSIS — I10 BENIGN ESSENTIAL HTN: Primary | ICD-10-CM

## 2025-04-22 DIAGNOSIS — J30.1 SEASONAL ALLERGIC RHINITIS DUE TO POLLEN: ICD-10-CM

## 2025-04-22 DIAGNOSIS — R87.9 ABNORMAL VAGINAL FLUIDS: ICD-10-CM

## 2025-04-22 LAB
ABSOLUTE EOSINOPHIL (OHS): 0.02 K/UL
ABSOLUTE MONOCYTE (OHS): 0.71 K/UL (ref 0.3–1)
ABSOLUTE NEUTROPHIL COUNT (OHS): 1.02 K/UL (ref 1.8–7.7)
ALBUMIN SERPL BCP-MCNC: 4.1 G/DL (ref 3.5–5.2)
ALP SERPL-CCNC: 101 UNIT/L (ref 40–150)
ALT SERPL W/O P-5'-P-CCNC: 52 UNIT/L (ref 10–44)
ANION GAP (OHS): 11 MMOL/L (ref 8–16)
AST SERPL-CCNC: 30 UNIT/L (ref 11–45)
BASOPHILS # BLD AUTO: 0.02 K/UL
BASOPHILS NFR BLD AUTO: 0.7 %
BILIRUB SERPL-MCNC: 0.3 MG/DL (ref 0.1–1)
BUN SERPL-MCNC: 8 MG/DL (ref 6–20)
CALCIUM SERPL-MCNC: 8.9 MG/DL (ref 8.7–10.5)
CHLORIDE SERPL-SCNC: 100 MMOL/L (ref 95–110)
CHOLEST SERPL-MCNC: 164 MG/DL (ref 120–199)
CHOLEST/HDLC SERPL: 4.2 {RATIO} (ref 2–5)
CO2 SERPL-SCNC: 27 MMOL/L (ref 23–29)
CREAT SERPL-MCNC: 0.7 MG/DL (ref 0.5–1.4)
EAG (OHS): 126 MG/DL (ref 68–131)
ERYTHROCYTE [DISTWIDTH] IN BLOOD BY AUTOMATED COUNT: 13.9 % (ref 11.5–14.5)
GFR SERPLBLD CREATININE-BSD FMLA CKD-EPI: >60 ML/MIN/1.73/M2
GLUCOSE SERPL-MCNC: 79 MG/DL (ref 70–110)
HBA1C MFR BLD: 6 % (ref 4–5.6)
HBV SURFACE AG SERPL QL IA: NORMAL
HCT VFR BLD AUTO: 41.1 % (ref 37–48.5)
HCV AB SERPL QL IA: NORMAL
HDLC SERPL-MCNC: 39 MG/DL (ref 40–75)
HDLC SERPL: 23.8 % (ref 20–50)
HGB BLD-MCNC: 13.1 GM/DL (ref 12–16)
HIV 1+2 AB+HIV1 P24 AG SERPL QL IA: NORMAL
IMM GRANULOCYTES # BLD AUTO: 0.02 K/UL (ref 0–0.04)
IMM GRANULOCYTES NFR BLD AUTO: 0.7 % (ref 0–0.5)
LDLC SERPL CALC-MCNC: 103.4 MG/DL (ref 63–159)
LYMPHOCYTES # BLD AUTO: 1.02 K/UL (ref 1–4.8)
MCH RBC QN AUTO: 27.8 PG (ref 27–31)
MCHC RBC AUTO-ENTMCNC: 31.9 G/DL (ref 32–36)
MCV RBC AUTO: 87 FL (ref 82–98)
NONHDLC SERPL-MCNC: 125 MG/DL
NUCLEATED RBC (/100WBC) (OHS): 0 /100 WBC
PLATELET # BLD AUTO: 183 K/UL (ref 150–450)
PMV BLD AUTO: 11.9 FL (ref 9.2–12.9)
POTASSIUM SERPL-SCNC: 3.5 MMOL/L (ref 3.5–5.1)
PROT SERPL-MCNC: 8.1 GM/DL (ref 6–8.4)
RBC # BLD AUTO: 4.71 M/UL (ref 4–5.4)
RELATIVE EOSINOPHIL (OHS): 0.7 %
RELATIVE LYMPHOCYTE (OHS): 36.3 % (ref 18–48)
RELATIVE MONOCYTE (OHS): 25.3 % (ref 4–15)
RELATIVE NEUTROPHIL (OHS): 36.3 % (ref 38–73)
SODIUM SERPL-SCNC: 138 MMOL/L (ref 136–145)
T PALLIDUM IGG+IGM SER QL: NORMAL
TRIGL SERPL-MCNC: 108 MG/DL (ref 30–150)
TSH SERPL-ACNC: 0.74 UIU/ML (ref 0.4–4)
WBC # BLD AUTO: 2.81 K/UL (ref 3.9–12.7)

## 2025-04-22 PROCEDURE — 87340 HEPATITIS B SURFACE AG IA: CPT

## 2025-04-22 PROCEDURE — 1160F RVW MEDS BY RX/DR IN RCRD: CPT | Mod: CPTII,S$GLB,, | Performed by: STUDENT IN AN ORGANIZED HEALTH CARE EDUCATION/TRAINING PROGRAM

## 2025-04-22 PROCEDURE — 4010F ACE/ARB THERAPY RXD/TAKEN: CPT | Mod: CPTII,S$GLB,, | Performed by: STUDENT IN AN ORGANIZED HEALTH CARE EDUCATION/TRAINING PROGRAM

## 2025-04-22 PROCEDURE — 99999 PR PBB SHADOW E&M-EST. PATIENT-LVL V: CPT | Mod: PBBFAC,,, | Performed by: STUDENT IN AN ORGANIZED HEALTH CARE EDUCATION/TRAINING PROGRAM

## 2025-04-22 PROCEDURE — 86593 SYPHILIS TEST NON-TREP QUANT: CPT

## 2025-04-22 PROCEDURE — 80061 LIPID PANEL: CPT

## 2025-04-22 PROCEDURE — 86803 HEPATITIS C AB TEST: CPT

## 2025-04-22 PROCEDURE — 87389 HIV-1 AG W/HIV-1&-2 AB AG IA: CPT

## 2025-04-22 PROCEDURE — 3074F SYST BP LT 130 MM HG: CPT | Mod: CPTII,S$GLB,, | Performed by: STUDENT IN AN ORGANIZED HEALTH CARE EDUCATION/TRAINING PROGRAM

## 2025-04-22 PROCEDURE — 3079F DIAST BP 80-89 MM HG: CPT | Mod: CPTII,S$GLB,, | Performed by: STUDENT IN AN ORGANIZED HEALTH CARE EDUCATION/TRAINING PROGRAM

## 2025-04-22 PROCEDURE — 1159F MED LIST DOCD IN RCRD: CPT | Mod: CPTII,S$GLB,, | Performed by: STUDENT IN AN ORGANIZED HEALTH CARE EDUCATION/TRAINING PROGRAM

## 2025-04-22 PROCEDURE — 99214 OFFICE O/P EST MOD 30 MIN: CPT | Mod: S$GLB,,, | Performed by: STUDENT IN AN ORGANIZED HEALTH CARE EDUCATION/TRAINING PROGRAM

## 2025-04-22 PROCEDURE — 85025 COMPLETE CBC W/AUTO DIFF WBC: CPT

## 2025-04-22 PROCEDURE — 3008F BODY MASS INDEX DOCD: CPT | Mod: CPTII,S$GLB,, | Performed by: STUDENT IN AN ORGANIZED HEALTH CARE EDUCATION/TRAINING PROGRAM

## 2025-04-22 PROCEDURE — 84295 ASSAY OF SERUM SODIUM: CPT

## 2025-04-22 PROCEDURE — 83036 HEMOGLOBIN GLYCOSYLATED A1C: CPT

## 2025-04-22 PROCEDURE — 84443 ASSAY THYROID STIM HORMONE: CPT

## 2025-04-22 RX ORDER — LISINOPRIL AND HYDROCHLOROTHIAZIDE 10; 12.5 MG/1; MG/1
1 TABLET ORAL
COMMUNITY
Start: 2025-02-13

## 2025-04-22 RX ORDER — SERTRALINE HYDROCHLORIDE 50 MG/1
50 TABLET, FILM COATED ORAL NIGHTLY
Qty: 90 TABLET | Refills: 3 | Status: SHIPPED | OUTPATIENT
Start: 2025-04-22 | End: 2026-04-22

## 2025-04-22 RX ORDER — CETIRIZINE HYDROCHLORIDE 10 MG/1
10 TABLET ORAL DAILY
Qty: 90 TABLET | Refills: 3 | Status: SHIPPED | OUTPATIENT
Start: 2025-04-22 | End: 2026-04-22

## 2025-04-22 NOTE — PROGRESS NOTES
CHW - Initial Contact    This Community Health Worker completed OR updated the Social Determinant of Health questionnaire with patient during clinic visit today.    Pt identified barriers of most importance are: Patient previously reported food insecurity, housing instability and financial strain   Referrals to community agencies completed with patient/caregiver consent outside of [facility] include: No.  Referrals were put through [facility] - no:   Support and Services: No.  Other information discussed the patient needs / wants help with: SDOH updated. Patient previously reported food insecurity, housing instability and financial strain. Patient stated she is not homeless or near homelessness. Patient declined any need for housing food and financial strain resources.  CHW advised patient shall the need arise to reach out to me directly.   Follow up required: No.

## 2025-04-22 NOTE — PROGRESS NOTES
SUBJECTIVE     Chief Complaint   Patient presents with    Weight Loss     Follow up    Cough    Results     Pt stated she would like to be retested to make sure she is clear    Hearing Problem     Pt stated she has hearing loss in one ear and she wants a referral to check again.       History of Present Illness    CHIEF COMPLAINT:  Patient presents today for medication adjustment due to side effects.    PSYCHIATRIC MEDICATIONS:  She reports experiencing daytime sleepiness from Wellbutrin and Lexapro which interferes with work performance. While the medications provide desired relaxation effects, she is unable to take them on workdays due to sedation.    CURRENT SYMPTOMS:  She reports back pain and a dry cough present for 5 days without other associated symptoms.    WEIGHT MANAGEMENT:  She reports taking Mounjaro obtained from sister-in-law who is a healthcare provider. She experienced weight loss of 17 lbs in first two weeks of treatment due to decreased appetite and reduced food intake, followed by a 7-pound weight gain during a cruise.    HYPERTENSION:  She takes blood pressure medication consistently at 5am daily for improved blood pressure control.    GYN HISTORY:  She reports vaginal odor with history of bacterial vaginosis and trichomonas.      ROS:  General: -fever, -chills, -fatigue, -weight gain, -weight loss, +excessive drowsiness, +increased appetite  Eyes: -vision changes, -redness, -discharge  ENT: -ear pain, -nasal congestion, -sore throat  Cardiovascular: -chest pain, -palpitations, -lower extremity edema  Respiratory: +cough, -shortness of breath  Gastrointestinal: -abdominal pain, -nausea, -vomiting, -diarrhea, -constipation, -blood in stool  Genitourinary: -dysuria, -hematuria, -frequency  Musculoskeletal: -joint pain, -muscle pain, +back pain  Skin: -rash, -lesion  Neurological: -headache, -dizziness, -numbness, -tingling  Psychiatric: -anxiety, -depression, -sleep difficulty  Female  "Genitourinary: +vaginal odor           PAST MEDICAL HISTORY:  Past Medical History:   Diagnosis Date    Chlamydia     treated    Essential hypertension 8/11/2023    Normal vaginal delivery     x4  (set of twins)    Vaginal trichomoniasis 06/05/2012    treated       ALLERGIES AND MEDICATIONS: updated and reviewed.  Review of patient's allergies indicates:  No Known Allergies  Current Medications[1]      OBJECTIVE     Physical Exam  Vitals:    04/22/25 0857   BP: 110/84   Pulse: 86   Resp: 18   Temp: 97.8 °F (36.6 °C)    Body mass index is 38.34 kg/m².  Weight: 111 kg (244 lb 13.1 oz)   Height: 5' 7" (170.2 cm)     Physical Exam  Vitals reviewed.   Constitutional:       General: She is not in acute distress.  HENT:      Right Ear: External ear normal.      Left Ear: External ear normal.      Nose: Nose normal.      Mouth/Throat:      Mouth: Mucous membranes are moist.   Eyes:      Extraocular Movements: Extraocular movements intact.      Conjunctiva/sclera: Conjunctivae normal.      Pupils: Pupils are equal, round, and reactive to light.   Pulmonary:      Effort: Pulmonary effort is normal.   Abdominal:      General: There is no distension.      Palpations: Abdomen is soft.   Musculoskeletal:         General: No swelling. Normal range of motion.      Cervical back: Normal range of motion.   Skin:     General: Skin is warm and dry.      Findings: No rash.   Neurological:      General: No focal deficit present.      Mental Status: She is alert and oriented to person, place, and time.   Psychiatric:         Mood and Affect: Mood normal.         Behavior: Behavior normal.           Health Maintenance         Date Due Completion Date    Cervical Cancer Screening Never done ---    TETANUS VACCINE Never done ---    Pneumococcal Vaccines (Age 0-49) (1 of 2 - PCV) Never done ---    Influenza Vaccine (1) 09/01/2024 11/11/2019    COVID-19 Vaccine (3 - 2024-25 season) 09/01/2024 4/3/2021    Hemoglobin A1c (Prediabetes) 09/05/2025 " 9/5/2024    Mammogram 10/02/2025 10/2/2024    RSV Vaccine (Age 60+ and Pregnant patients) (1 - 1-dose 75+ series) 01/30/2058 ---              ASSESSMENT     40 y.o. female with     1. Benign essential HTN    2. Prediabetes    3. Moderate episode of recurrent major depressive disorder    4. Class 2 obesity due to excess calories with body mass index (BMI) of 38.0 to 38.9 in adult, unspecified whether serious comorbidity present    5. Conductive hearing loss of right ear with restricted hearing of left ear    6. Abnormal vaginal fluids    7. Routine screening for STI (sexually transmitted infection)    8. Seasonal allergic rhinitis due to pollen      F32.9 Major depressive disorder, single episode, unspecified  I10 Essential (primary) hypertension  M54.9 Dorsalgia, unspecified  J30.9 Allergic rhinitis, unspecified  Z87.42 Personal history of other diseases of the female genital tract  Z86.19 Personal history of other infectious and parasitic diseases    IMPRESSION:  Discontinued Wellbutrin and Lexapro due to daytime sleepiness.  Considered cough and back pain as potentially related to viral infection or seasonal allergies.  Assessed use of Mounjaro (semaglutide) for weight loss, obtained through family connection, as appropriate given reported efficacy and safety profile.  Noted improvement in BP control with consistent morning medication administration.  PLAN:     1. Benign essential HTN  - Confirmed that the patient's blood pressure is well-controlled.  - Instructed the patient to continue taking the current antihypertensive medication consistently at 5:00 AM daily to maintain proper control.  - Commended the patient for diligent home blood pressure monitoring and medication adherence.  - Hemoglobin A1C; Future  - Lipid Panel; Future  - TSH; Future  - Comprehensive Metabolic Panel; Future  - CBC Auto Differential; Future    2. Prediabetes  -Stable. Patient is encouraged to follow a diet low in carbohydrates and  simple sugars. Advised to focus on good food choices and increased physical activity and encouraged to adhere to medication regimen and/or lifestyle adjustments, and to check glucose level as recommended.  Contact office if glucose levels are not improving over time.  Will monitor HbA1c appropriately.   - Hemoglobin A1C; Future  - Lipid Panel; Future  - TSH; Future  - Comprehensive Metabolic Panel; Future  - CBC Auto Differential; Future    3. Moderate episode of recurrent major depressive disorder  - Discontinued Wellbutrin and Lexapro due to reported side effects of excessive somnolence and cognitive impairment affecting the patient's work performance.  - Prescribed Zoloft as an alternative antidepressant to be taken daily at bedtime, anticipating better tolerability.  - Emphasized the importance of daily administration for optimal efficacy.  - Scheduled a follow-up visit in 3 months to assess the effectiveness of Zoloft.  - sertraline (ZOLOFT) 50 MG tablet; Take 1 tablet (50 mg total) by mouth every evening.  Dispense: 90 tablet; Refill: 3  - Hemoglobin A1C; Future  - Lipid Panel; Future  - TSH; Future  - Comprehensive Metabolic Panel; Future  - CBC Auto Differential; Future    4. Class 2 obesity due to excess calories with body mass index (BMI) of 38.0 to 38.9 in adult, unspecified whether serious comorbidity present  - Due, ordered.  - Hemoglobin A1C; Future  - Lipid Panel; Future  - TSH; Future  - Comprehensive Metabolic Panel; Future  - CBC Auto Differential; Future    5. Conductive hearing loss of right ear with restricted hearing of left ear  - Refer to ENT and audiology.  - Hemoglobin A1C; Future  - Lipid Panel; Future  - TSH; Future  - Comprehensive Metabolic Panel; Future  - CBC Auto Differential; Future  - Ambulatory referral/consult to Audiology; Future  - Ambulatory referral/consult to ENT; Future    6. Abnormal vaginal fluids  - Noted the patient's report of an unusual odor, possibly related to a  previous vaginal infection.  - Acknowledged the patient's history of bacterial vaginosis and trichomoniasis treatment.  - Ordered a vaginal swab to check for recurrence of infection.  - C. trachomatis/N. gonorrhoeae by AMP DNA  - Vaginosis Screen by DNA Probe    7. Routine screening for STI (sexually transmitted infection)  - Scheduled STD testing as requested by the patient.  - Ordered comprehensive STD testing as requested by the patient.   - Hepatitis B Surface Antigen; Future  - Hepatitis C Antibody; Future  - HIV 1/2 Ag/Ab (4th Gen); Future  - Treponema Pallidium Antibodies IgG, IgM; Future    8. Seasonal allergic rhinitis due to pollen  - Evaluated the patient's dry cough, which has persisted for approximately 5 days, potentially related to allergies or a viral infection.  - Prescribed Zyrtec, a non-sedating antihistamine, to be taken daily for allergy symptom management, then as needed.  - Educated the patient about Zyrtec as a non-sedating antihistamine option.  - Sent a prescription for Zyrtec to the patient's preferred pharmacy.  - cetirizine (ZYRTEC) 10 MG tablet; Take 1 tablet (10 mg total) by mouth once daily.  Dispense: 90 tablet; Refill: 3      Gillian Patterson MD  04/22/2025 9:28 AM        Follow up in about 3 months (around 7/22/2025).    This note was generated with the assistance of ambient listening technology. Verbal consent was obtained by the patient and accompanying visitor(s) for the recording of patient appointment to facilitate this note. I attest to having reviewed and edited the generated note for accuracy, though some syntax or spelling errors may persist. Please contact the author of this note for any clarification.                     [1]   Current Outpatient Medications   Medication Sig Dispense Refill    fluocinolone (DERMA-SMOOTHE) 0.01 % external oil Apply topically nightly as needed (scalp flaking). 118.28 mL 2    ketoconazole (NIZORAL) 2 % shampoo Apply topically twice a week. 120  mL 5    lisinopriL-hydrochlorothiazide (PRINZIDE,ZESTORETIC) 10-12.5 mg per tablet Take 1 tablet by mouth.      losartan (COZAAR) 50 MG tablet Take 1 tablet (50 mg total) by mouth once daily. 90 tablet 3    tirzepatide 5 mg/0.5 mL PnIj Inject 5 mg into the skin every 7 days.      cetirizine (ZYRTEC) 10 MG tablet Take 1 tablet (10 mg total) by mouth once daily. 90 tablet 3    ciclopirox (PENLAC) 8 % Soln Apply topically nightly. (Patient not taking: Reported on 4/22/2025) 6.6 mL 4    drospirenone, contraceptive, (SLYND) 4 mg (28) Tab Take 1 tablet every day for 21 days, then do not take any medication for 7 days. You will get your cycle during this time. Restart after you complete the 7 days. (Patient not taking: Reported on 4/22/2025) 28 tablet 11    sertraline (ZOLOFT) 50 MG tablet Take 1 tablet (50 mg total) by mouth every evening. 90 tablet 3     No current facility-administered medications for this visit.

## 2025-04-22 NOTE — PATIENT INSTRUCTIONS
[phone] to schedule with audiology and ENT      Medicines:  -Medicines may be needed to stop the cough, decrease swelling in your airways, or help open your airways. Medicine may also be given to help you cough up mucus. If you have an infection caused by bacteria, you may need antibiotics, but they are not helpful for viral infections.  -Take your medicine as directed. Contact me if you think your medicine is not helping or if you have side effects.     Manage your symptoms:  -Do not smoke and stay away from others who smoke. Nicotine and other chemicals in cigarettes and cigars can cause lung damage and make your cough worse.  -Drink extra liquids especially hot liquids such as herbal/decaf tea, broth, or hot water with honey and lemon (avoid honey if you have diabetes). Honey can help thin mucus and decrease your cough. Liquids will help thin and loosen mucus so you can cough it up. Liquids will also help prevent dehydration. Do not drink liquids that contain caffeine. Caffeine can increase your risk for dehydration.   -Rest as much as possible. You may do a lighter version of your typical exercise routine as this has not been shown to prolong your symptoms, but listen to your body and rest if needed.  -Use a humidifier. Use a cool mist humidifier or a hot shower to increase air moisture in your home. This may make it easier for you to breathe and help decrease your cough.  -Use cough drops or lozenges. These can help decrease throat irritation and your cough. Again, watch for sugar content if you have diabetes.    Dr. Patterson's Chicken Soup Recipe  Ingredients:  2 lbs plain raw chicken wings  water  1 tsp oil  3 carrots, chopped into large chunks  1 bunch scallions  1 package shiitake mushrooms (optional)  1 large piece raw rajesh, peeled  4 garlic cloves, smashed  ¼ tsp turmeric  ½ tsp black pepper  2 tsp salt    Heat large pot over medium high heat for 1 minute.   Add 1 tsp oil, then chicken wings.  Saute 6-8 minutes without stirring.   Add in water to fill the pot ¾ full, then add the rest of the ingredients. Stir.  Bring to a boil, then turn heat down to low and simmer for 4-8 hours. Strain out solids if desired. Add cooked rice or noodles as per your preference. Add salt and lemon juice to taste. Can also be made in crock pot.

## 2025-04-23 ENCOUNTER — RESULTS FOLLOW-UP (OUTPATIENT)
Facility: CLINIC | Age: 40
End: 2025-04-23

## 2025-04-23 LAB
C TRACH DNA SPEC QL NAA+PROBE: NOT DETECTED
CTGC SOURCE (OHS) ORD-325: NORMAL
N GONORRHOEA DNA UR QL NAA+PROBE: NOT DETECTED

## 2025-04-25 LAB
BACTERIAL VAGINOSIS DNA (OHS): NOT DETECTED
CANDIDA GLABRATA/KRUSEI DNA (OHS): NOT DETECTED
CANDIDA SPECIES DNA (OHS): NOT DETECTED
TRICHOMONAS VAGINALIS DNA (OHS): NOT DETECTED

## 2025-05-14 ENCOUNTER — TELEPHONE (OUTPATIENT)
Facility: CLINIC | Age: 40
End: 2025-05-14
Payer: COMMERCIAL

## 2025-05-14 DIAGNOSIS — I10 BENIGN ESSENTIAL HTN: Primary | ICD-10-CM

## 2025-05-14 RX ORDER — LISINOPRIL AND HYDROCHLOROTHIAZIDE 10; 12.5 MG/1; MG/1
1 TABLET ORAL DAILY
Qty: 90 TABLET | Refills: 3 | Status: SHIPPED | OUTPATIENT
Start: 2025-05-14 | End: 2026-05-14

## 2025-05-14 NOTE — TELEPHONE ENCOUNTER
----- Message from Med Assistant Barboza sent at 5/13/2025  2:17 PM CDT -----  Contact: Pt [phone]  See medication request.  ----- Message -----  From: Shruthi Lindsey  Sent: 5/13/2025   1:59 PM CDT  To: Leonardo French Staff    Requesting an RX refill or new RX.  # # # PATIENT IS OUT OF MEDICATION # # # Is this a refill or new RX: RefillRX name and strength (copy/paste from chart):  lisinopriL-hydrochlorothiazide (PRINZIDE,ZESTORETIC) 10-12.5 mg per tabletIs this a 30 day or 90 day RX: 90Pharmacy name and phone # (copy/paste from chart):  [id number] [address] [facility] [id number] [facility] [id number]: [phone] Fax: [phone] call and advise.Thank You

## 2025-05-14 NOTE — TELEPHONE ENCOUNTER
Called pt and there was no answer, a voicemail was left informing pt that her paperwork was faxed over to the number provided and also informed pt that she can come by the office and pick it up if she needs to.

## 2025-05-14 NOTE — TELEPHONE ENCOUNTER
----- Message from Gillian Patterson MD sent at 2025  1:04 PM CDT -----  Contact: [phone] (Home Phone)  .  ----- Message -----  From: Amara Fletcher MA  Sent: 2025  10:05 AM CDT  To: Gillian Patterson MD    Please advise. Pt is requesting paperwork that was dropped off.  ----- Message -----  From: Gillian Patterson MD  Sent: 2025   1:17 PM CDT  To: Amara Fletcher MA      ----- Message -----  From: Tamra Winston MA  Sent: 2025   1:51 PM CDT  To: Gillian Patterson MD    Please advise.  ----- Message -----  From: Idalmis Valiente  Sent: 2025  10:40 AM CDT  To: Leonardo French Staff    .1MEDICALADVICE Patient is calling for Medical Advice regarding:Pt is calling in regards to some paperwork dropped off about two weeks at the    , pt is calling to fu and see if paperwork is completed and ready for  Pt states if paperwork has been misplaced please let her know today due to paperwork about to  How long has patient had these symptoms:Pharmacy name and phone#:[facility] a call back or [facility]esar hern[facility]dez Please call pt  and advise

## 2025-07-03 ENCOUNTER — PATIENT MESSAGE (OUTPATIENT)
Dept: INTERNAL MEDICINE | Facility: CLINIC | Age: 40
End: 2025-07-03
Payer: COMMERCIAL

## 2025-08-26 ENCOUNTER — NURSE TRIAGE (OUTPATIENT)
Dept: ADMINISTRATIVE | Facility: CLINIC | Age: 40
End: 2025-08-26
Payer: COMMERCIAL